# Patient Record
Sex: MALE | Race: WHITE | Employment: FULL TIME | URBAN - METROPOLITAN AREA
[De-identification: names, ages, dates, MRNs, and addresses within clinical notes are randomized per-mention and may not be internally consistent; named-entity substitution may affect disease eponyms.]

---

## 2018-03-25 ENCOUNTER — APPOINTMENT (OUTPATIENT)
Dept: GENERAL RADIOLOGY | Age: 23
End: 2018-03-25
Attending: EMERGENCY MEDICINE
Payer: COMMERCIAL

## 2018-03-25 ENCOUNTER — HOSPITAL ENCOUNTER (EMERGENCY)
Age: 23
Discharge: HOME | End: 2018-03-25
Attending: EMERGENCY MEDICINE
Payer: COMMERCIAL

## 2018-03-25 VITALS
BODY MASS INDEX: 34.27 KG/M2 | TEMPERATURE: 99 F | HEART RATE: 124 BPM | SYSTOLIC BLOOD PRESSURE: 149 MMHG | WEIGHT: 253 LBS | OXYGEN SATURATION: 98 % | HEIGHT: 72 IN | DIASTOLIC BLOOD PRESSURE: 90 MMHG | RESPIRATION RATE: 20 BRPM

## 2018-03-25 DIAGNOSIS — S60.221A CONTUSION OF RIGHT HAND, INITIAL ENCOUNTER: ICD-10-CM

## 2018-03-25 DIAGNOSIS — S63.501A SPRAIN OF RIGHT WRIST, INITIAL ENCOUNTER: Primary | ICD-10-CM

## 2018-03-25 PROCEDURE — 73130 X-RAY EXAM OF HAND: CPT | Performed by: EMERGENCY MEDICINE

## 2018-03-25 PROCEDURE — 73110 X-RAY EXAM OF WRIST: CPT | Performed by: EMERGENCY MEDICINE

## 2018-03-25 PROCEDURE — 99283 EMERGENCY DEPT VISIT LOW MDM: CPT

## 2018-03-25 RX ORDER — ERGOCALCIFEROL 1.25 MG/1
CAPSULE ORAL
COMMUNITY
End: 2019-12-26 | Stop reason: ALTCHOICE

## 2018-03-25 RX ORDER — DEXMETHYLPHENIDATE HYDROCHLORIDE 20 MG/1
40 CAPSULE, EXTENDED RELEASE ORAL DAILY
COMMUNITY
End: 2019-12-06

## 2018-03-25 NOTE — ED PROVIDER NOTES
Patient Seen in: THE [facility] Emergency Department In [place]    History   Patient presents with:  Upper Extremity Injury (musculoskeletal)    Stated Complaint:     HPI    70-year-old male comes the hospital with a complaint of having pain by the right wris Technologist)  Patient fell onto outstretched right arm on concrete today injuring his right hand and wrist. He has pain throughout the palmar aspect of his right metacarpals radiating to his wrist. He has a tingling sensation throughout his digits.  Unable [id number]  [phone]    Schedule an appointment as soon as possible for a visit in 2 days          Medications Prescribed:  Current Discharge Medication List

## 2019-12-26 ENCOUNTER — HOSPITAL ENCOUNTER (EMERGENCY)
Age: 24
Discharge: HOME | End: 2019-12-26
Attending: EMERGENCY MEDICINE
Payer: COMMERCIAL

## 2019-12-26 VITALS
WEIGHT: 265 LBS | TEMPERATURE: 99 F | HEIGHT: 72 IN | SYSTOLIC BLOOD PRESSURE: 161 MMHG | OXYGEN SATURATION: 96 % | RESPIRATION RATE: 20 BRPM | HEART RATE: 103 BPM | DIASTOLIC BLOOD PRESSURE: 74 MMHG | BODY MASS INDEX: 35.89 KG/M2

## 2019-12-26 DIAGNOSIS — M26.622 ARTHRALGIA OF LEFT TEMPOROMANDIBULAR JOINT: Primary | ICD-10-CM

## 2019-12-26 PROCEDURE — 99283 EMERGENCY DEPT VISIT LOW MDM: CPT

## 2019-12-26 RX ORDER — CYCLOBENZAPRINE HCL 10 MG
10 TABLET ORAL 3 TIMES DAILY PRN
Qty: 20 TABLET | Refills: 0 | Status: SHIPPED | OUTPATIENT
Start: 2019-12-26 | End: 2020-01-02

## 2019-12-27 NOTE — ED NOTES
I reviewed that chart and discussed the case. I have examined the patient and noted complains of jaw pain primarily when he closes his jaw. He states that he had no trauma or injury. The patient denies any teeth pain swelling fevers.   The pain is defini

## 2019-12-27 NOTE — ED PROVIDER NOTES
Patient Seen in: [facility] In [place]      History   Patient presents with:  Jaw Pain    Stated Complaint: L SIDE JAW PAIN    22-year-old male who presents to the emergency room with complaints of left jaw pain and swelling for the p (!) 161/74   Pulse 103   Temp 99 °F (37.2 °C) (Temporal)   Resp 20   Ht 182.9 cm (6')   Wt 120.2 kg   SpO2 96%   BMI 35.94 kg/m²         Physical Exam  Vitals signs and nursing note reviewed. Constitutional:       General: He is not in acute distress. signs and symptoms which should prompt immediate return. The patient and/or family expressed clear understanding of these instructions and agrees to the following plan provided.   The patient and/or family was also given written discharge instructions incl

## 2020-01-23 ENCOUNTER — HOSPITAL ENCOUNTER (EMERGENCY)
Age: 25
Discharge: HOME | End: 2020-01-23
Attending: EMERGENCY MEDICINE
Payer: COMMERCIAL

## 2020-01-23 VITALS
RESPIRATION RATE: 16 BRPM | OXYGEN SATURATION: 97 % | WEIGHT: 265 LBS | TEMPERATURE: 98 F | BODY MASS INDEX: 35.89 KG/M2 | DIASTOLIC BLOOD PRESSURE: 84 MMHG | HEART RATE: 94 BPM | HEIGHT: 72 IN | SYSTOLIC BLOOD PRESSURE: 158 MMHG

## 2020-01-23 DIAGNOSIS — Z11.3 SCREEN FOR STD (SEXUALLY TRANSMITTED DISEASE): Primary | ICD-10-CM

## 2020-01-23 PROCEDURE — 87491 CHLMYD TRACH DNA AMP PROBE: CPT | Performed by: EMERGENCY MEDICINE

## 2020-01-23 PROCEDURE — 99283 EMERGENCY DEPT VISIT LOW MDM: CPT

## 2020-01-23 PROCEDURE — 87591 N.GONORRHOEAE DNA AMP PROB: CPT | Performed by: EMERGENCY MEDICINE
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
## 2020-01-24 LAB
C TRACH DNA SPEC QL NAA+PROBE: NEGATIVE
N GONORRHOEA DNA SPEC QL NAA+PROBE: NEGATIVE

## 2021-09-27 ENCOUNTER — HOSPITAL ENCOUNTER (EMERGENCY)
Age: 26
Discharge: STILL ADMITTED OR ADMINISTRATIVE | End: 2021-09-28
Attending: EMERGENCY MEDICINE
Payer: COMMERCIAL

## 2021-09-27 ENCOUNTER — APPOINTMENT (OUTPATIENT)
Dept: GENERAL RADIOLOGY | Age: 26
End: 2021-09-27
Attending: EMERGENCY MEDICINE
Payer: COMMERCIAL

## 2021-09-27 VITALS
WEIGHT: 225 LBS | HEIGHT: 72 IN | RESPIRATION RATE: 20 BRPM | HEART RATE: 108 BPM | SYSTOLIC BLOOD PRESSURE: 121 MMHG | DIASTOLIC BLOOD PRESSURE: 59 MMHG | TEMPERATURE: 99 F | OXYGEN SATURATION: 98 % | BODY MASS INDEX: 30.48 KG/M2

## 2021-09-27 DIAGNOSIS — S82.042C: Primary | ICD-10-CM

## 2021-09-27 PROBLEM — R79.89 AZOTEMIA: Status: ACTIVE | Noted: 2021-09-27

## 2021-09-27 LAB
ALBUMIN SERPL-MCNC: 3.4 G/DL (ref 3.4–5)
ALBUMIN/GLOB SERPL: 0.7 {RATIO} (ref 1–2)
ALP LIVER SERPL-CCNC: 112 U/L
ALT SERPL-CCNC: 58 U/L
ANION GAP SERPL CALC-SCNC: 6 MMOL/L (ref 0–18)
AST SERPL-CCNC: 24 U/L (ref 15–37)
BASOPHILS # BLD AUTO: 0.05 X10(3) UL (ref 0–0.2)
BASOPHILS NFR BLD AUTO: 0.5 %
BILIRUB SERPL-MCNC: 0.3 MG/DL (ref 0.1–2)
BUN BLD-MCNC: 20 MG/DL (ref 7–18)
CALCIUM BLD-MCNC: 9.5 MG/DL (ref 8.5–10.1)
CHLORIDE SERPL-SCNC: 106 MMOL/L (ref 98–112)
CO2 SERPL-SCNC: 25 MMOL/L (ref 21–32)
CREAT BLD-MCNC: 0.61 MG/DL
CRP SERPL-MCNC: 4.43 MG/DL (ref ?–0.3)
EOSINOPHIL # BLD AUTO: 0.25 X10(3) UL (ref 0–0.7)
EOSINOPHIL NFR BLD AUTO: 2.5 %
ERYTHROCYTE [DISTWIDTH] IN BLOOD BY AUTOMATED COUNT: 14.2 %
GLOBULIN PLAS-MCNC: 4.8 G/DL (ref 2.8–4.4)
GLUCOSE BLD-MCNC: 74 MG/DL (ref 70–99)
HCT VFR BLD AUTO: 45.2 %
HGB BLD-MCNC: 14.7 G/DL
IMM GRANULOCYTES # BLD AUTO: 0.12 X10(3) UL (ref 0–1)
IMM GRANULOCYTES NFR BLD: 1.2 %
LYMPHOCYTES # BLD AUTO: 2.07 X10(3) UL (ref 1–4)
LYMPHOCYTES NFR BLD AUTO: 20.4 %
MCH RBC QN AUTO: 27.7 PG (ref 26–34)
MCHC RBC AUTO-ENTMCNC: 32.5 G/DL (ref 31–37)
MCV RBC AUTO: 85.3 FL
MONOCYTES # BLD AUTO: 0.73 X10(3) UL (ref 0.1–1)
MONOCYTES NFR BLD AUTO: 7.2 %
NEUTROPHILS # BLD AUTO: 6.92 X10 (3) UL (ref 1.5–7.7)
NEUTROPHILS # BLD AUTO: 6.92 X10(3) UL (ref 1.5–7.7)
NEUTROPHILS NFR BLD AUTO: 68.2 %
OSMOLALITY SERPL CALC.SUM OF ELEC: 285 MOSM/KG (ref 275–295)
PLATELET # BLD AUTO: 351 10(3)UL (ref 150–450)
POTASSIUM SERPL-SCNC: 4.2 MMOL/L (ref 3.5–5.1)
PROT SERPL-MCNC: 8.2 G/DL (ref 6.4–8.2)
RBC # BLD AUTO: 5.3 X10(6)UL
SARS-COV-2 RNA RESP QL NAA+PROBE: NOT DETECTED
SED RATE-ML: 48 MM/HR
SODIUM SERPL-SCNC: 137 MMOL/L (ref 136–145)
WBC # BLD AUTO: 10.1 X10(3) UL (ref 4–11)

## 2021-09-27 PROCEDURE — 99285 EMERGENCY DEPT VISIT HI MDM: CPT

## 2021-09-27 PROCEDURE — 85652 RBC SED RATE AUTOMATED: CPT | Performed by: EMERGENCY MEDICINE

## 2021-09-27 PROCEDURE — 87077 CULTURE AEROBIC IDENTIFY: CPT | Performed by: EMERGENCY MEDICINE

## 2021-09-27 PROCEDURE — 96366 THER/PROPH/DIAG IV INF ADDON: CPT

## 2021-09-27 PROCEDURE — 80053 COMPREHEN METABOLIC PANEL: CPT | Performed by: EMERGENCY MEDICINE

## 2021-09-27 PROCEDURE — 87070 CULTURE OTHR SPECIMN AEROBIC: CPT | Performed by: EMERGENCY MEDICINE

## 2021-09-27 PROCEDURE — 87186 SC STD MICRODIL/AGAR DIL: CPT | Performed by: EMERGENCY MEDICINE

## 2021-09-27 PROCEDURE — 85025 COMPLETE CBC W/AUTO DIFF WBC: CPT | Performed by: EMERGENCY MEDICINE

## 2021-09-27 PROCEDURE — 87205 SMEAR GRAM STAIN: CPT | Performed by: EMERGENCY MEDICINE

## 2021-09-27 PROCEDURE — 73560 X-RAY EXAM OF KNEE 1 OR 2: CPT | Performed by: EMERGENCY MEDICINE

## 2021-09-27 PROCEDURE — 96365 THER/PROPH/DIAG IV INF INIT: CPT

## 2021-09-27 PROCEDURE — 86140 C-REACTIVE PROTEIN: CPT | Performed by: EMERGENCY MEDICINE

## 2021-09-27 PROCEDURE — 73562 X-RAY EXAM OF KNEE 3: CPT | Performed by: EMERGENCY MEDICINE

## 2021-09-27 RX ORDER — ONDANSETRON 4 MG/1
4 TABLET, ORALLY DISINTEGRATING ORAL ONCE
Status: COMPLETED | OUTPATIENT
Start: 2021-09-27 | End: 2021-09-27

## 2021-09-27 RX ORDER — GABAPENTIN 400 MG/1
500 CAPSULE ORAL 2 TIMES DAILY
COMMUNITY

## 2021-09-27 RX ORDER — TAMSULOSIN HYDROCHLORIDE 0.4 MG/1
CAPSULE ORAL DAILY
COMMUNITY

## 2021-09-27 RX ORDER — CYCLOBENZAPRINE HCL 10 MG
10 TABLET ORAL 2 TIMES DAILY
COMMUNITY

## 2021-09-27 RX ORDER — PRAZOSIN HYDROCHLORIDE 1 MG/1
1 CAPSULE ORAL NIGHTLY
COMMUNITY
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
## 2021-09-27 NOTE — ED PROVIDER NOTES
Patient Seen in: THE [facility] Emergency Department In [place]      History   Patient presents with:  Rash Skin Problem    Stated Complaint: wound evaluation to amputation site    Subjective:   HPI    31-year-old with a history of asthma, ADHD, hypertension, reviewed and negative except as noted above.     Physical Exam     ED Triage Vitals [09/27/21 1619]   /73   Pulse 77   Resp 16   Temp 97.9 °F (36.6 °C)   Temp src Temporal   SpO2 98 %   O2 Device None (Room air)       Current:/59   Pulse 108   T CBC W/ DIFFERENTIAL[[id number]]                              Final result                 Please view results for these tests on the individual orders.    AEROBIC BACTERIAL CULTURE   CBC W/ DIFFERENTIAL     Left knee: Comminuted, displaced and an

## 2021-09-30 NOTE — ED QUICK NOTES
Dr. Tammy Gonsalves resident from [address] called. He is working with id. Wound culture results given to him.

## 2023-02-12 NOTE — ED NOTES
Dr. Taylor Hammer from [facility] ([facility]) called back regarding lab result.
PT transferred to [facility] ([facility]). Will fax culture result.
12-Feb-2023 21:00

## 2024-09-26 ENCOUNTER — HOSPITAL ENCOUNTER (EMERGENCY)
Age: 29
Discharge: HOME | End: 2024-09-26
Attending: EMERGENCY MEDICINE
Payer: MEDICARE

## 2024-09-26 ENCOUNTER — APPOINTMENT (OUTPATIENT)
Dept: GENERAL RADIOLOGY | Age: 29
End: 2024-09-26
Attending: EMERGENCY MEDICINE
Payer: MEDICARE

## 2024-09-26 VITALS
DIASTOLIC BLOOD PRESSURE: 100 MMHG | SYSTOLIC BLOOD PRESSURE: 150 MMHG | RESPIRATION RATE: 16 BRPM | OXYGEN SATURATION: 98 % | WEIGHT: 250 LBS | HEART RATE: 98 BPM | TEMPERATURE: 98 F | HEIGHT: 73 IN | BODY MASS INDEX: 33.13 KG/M2

## 2024-09-26 DIAGNOSIS — S52.022A CLOSED FRACTURE OF OLECRANON PROCESS OF LEFT ULNA, INITIAL ENCOUNTER: Primary | ICD-10-CM

## 2024-09-26 PROCEDURE — 99284 EMERGENCY DEPT VISIT MOD MDM: CPT

## 2024-09-26 PROCEDURE — 73080 X-RAY EXAM OF ELBOW: CPT | Performed by: EMERGENCY MEDICINE

## 2024-09-26 RX ORDER — TRAZODONE HYDROCHLORIDE 50 MG/1
50 TABLET, FILM COATED ORAL NIGHTLY
COMMUNITY

## 2024-09-26 RX ORDER — HYDROCODONE BITARTRATE AND ACETAMINOPHEN 5; 325 MG/1; MG/1
2 TABLET ORAL ONCE
Status: COMPLETED | OUTPATIENT
Start: 2024-09-26 | End: 2024-09-26

## 2024-09-26 RX ORDER — BUPROPION HYDROCHLORIDE 300 MG/1
300 TABLET ORAL DAILY
COMMUNITY

## 2024-09-26 RX ORDER — HYDROCODONE BITARTRATE AND ACETAMINOPHEN 5; 325 MG/1; MG/1
1 TABLET ORAL EVERY 8 HOURS PRN
Qty: 15 TABLET | Refills: 0 | Status: SHIPPED | OUTPATIENT
Start: 2024-09-26 | End: 2024-10-01

## 2024-09-26 RX ORDER — ASPIRIN 81 MG/1
TABLET, CHEWABLE ORAL DAILY
COMMUNITY

## 2024-09-26 NOTE — ED INITIAL ASSESSMENT (HPI)
Pt to ed with left arm/elbow injury after missing 1 step [place] the movie theater today. Tingling in fingers.

## 2024-09-27 NOTE — ED PROVIDER NOTES
Patient Seen in: [facility] In [place]      History     Chief Complaint   Patient presents with    Arm or Hand Injury     Stated Complaint: left elbow injury, fall down one step    Subjective:   29-year-old male, history of hypertension and renal failure, left lower leg amputation, presents with mechanical fall, left elbow injury.  Patient had plates and screws for fracture to his left distal radius in the past.  Tonight he is at the movie theater, questioning because he did not want take his wheelchair into the movie theater, and the last step he got imbalanced on his crutches and fell forward into a flexed left elbow.  No head neck or back injuries or pain.  No other pain or injuries.  Pain to the left elbow which does not radiate.  Nothing in the shoulder forearm wrist or hand.  Right-hand-dominant.  Not on blood thinners            Objective:   Past Medical History:    ADHD    Asthma (HCC)    DVT, femoral, acute (HCC)    Essential hypertension    Kidney failure              Past Surgical History:   Procedure Laterality Date    Below knee leg amputation Left 04/27/2021    Femur fracture surgery Left     titanium patrick    Pelvis/hip joint surgery unlisted      screws    Tonsillectomy      Upper arm/elbow surgery unlisted      Wrist fracture surgery Right                 Social History     Socioeconomic History    Marital status: Single   Tobacco Use    Smoking status: Never    Smokeless tobacco: Never   Vaping Use    Vaping status: Every Day   Substance and Sexual Activity    Alcohol use: Not Currently    Drug use: Never              Review of Systems   Respiratory:  Negative for shortness of breath.    Cardiovascular:  Negative for chest pain.   Gastrointestinal:  Negative for abdominal pain.   Musculoskeletal:  Positive for arthralgias. Negative for back pain and neck pain.   Neurological:  Negative for dizziness, syncope and headaches.   Hematological:  Does not bruise/bleed easily.        Positive for stated Chief Complaint: Arm or Hand Injury    Other systems are as noted in HPI.  Constitutional and vital signs reviewed.      All other systems reviewed and negative except as noted above.    Physical Exam     ED Triage Vitals [09/26/24 1902]   BP (!) 128/98   Pulse 92   Resp 18   Temp 97.9 °F (36.6 °C)   Temp src Temporal   SpO2 97 %   O2 Device None (Room air)       Current Vitals:   Vital Signs  BP: (!) 128/98  Pulse: 92  Resp: 18  Temp: 97.9 °F (36.6 °C)  Temp src: Temporal    Oxygen Therapy  SpO2: 97 %  O2 Device: None (Room air)            Physical Exam  Vitals and nursing note reviewed.   Constitutional:       Appearance: He is not toxic-appearing.   HENT:      Head: Normocephalic and atraumatic.   Cardiovascular:      Rate and Rhythm: Normal rate.      Heart sounds: Normal heart sounds.   Pulmonary:      Effort: Pulmonary effort is normal. No respiratory distress.      Breath sounds: Normal breath sounds.   Chest:      Chest wall: No tenderness.   Abdominal:      Palpations: Abdomen is soft.   Musculoskeletal:      Cervical back: Neck supple.   Skin:     General: Skin is warm and dry.   Neurological:      General: No focal deficit present.      Mental Status: He is alert.   Psychiatric:         Mood and Affect: Mood normal.         Behavior: Behavior normal.         Tenderness to the left olecranon and elbow.  No pain in the forearm wrist or hand.  No pain in the humerus or shoulder.  No clavicular pain.  Neck or back pain.  No chest wall pain.  No abdominal pain.  2+ radial pulses.  Cap refill intact.  CMS intact.    ED Course   Labs Reviewed - No data to display                   MDM      XR ELBOW, COMPLETE (MIN 3 VIEWS), LEFT (CPT=73080)    Result Date: 9/26/2024  CONCLUSION:  Moderately displaced and mildly angulated intra-articular fracture through the olecranon process of the proximal left ulna with displacement measured at approximately 1.7 cm.  LOCATION:  [id number]    Dictated by  (CST): Femi Marr MD on 9/26/2024 at 7:15 PM     Finalized by (CST): Femi Marr MD on 9/26/2024 at 7:16 PM        External chart review demonstrates outpatient visits in 2021 with duly ortho out [facility], Dr. Keon Cuellar for his forearm wrist fracture      Mother at bedside helpful to provide information on the history presenting illness      I independently interpreted the x-ray of the left elbow and note the olecranon fracture      Differential diagnosis includes, but limited to, fracture, sprain, strain, contusion, dislocation      29-year-old male with olecranon fracture of the left elbow.  Discussed with Dr. Bee, can splinted in slight extension and can follow-up with orthopedics, Dr. Cuellar.  He has seen him before for surgery on that extremity.  [place] here.  Vital signs are stable.  No other injuries.  Is comfortable going home as he does have a wheelchair that he can use, does not need to crutch.  He is right-hand dominant.  Neurovascular intact.  Discharge home, shared decision made utilized and return precaution provided.  Pain control, immobilization.  Strict return precautions for compartment syndrome etc.                                           Medical Decision Making      Disposition and Plan     Clinical Impression:  1. Closed fracture of olecranon process of left ulna, initial encounter         Disposition:  Discharge  9/26/2024  7:36 pm    Follow-up:  Keon Cuellar MD  [address] [place] [id number]  [phone]    Schedule an appointment as soon as possible for a visit            Medications Prescribed:  Current Discharge Medication List        START taking these medications    Details   HYDROcodone-acetaminophen 5-325 MG Oral Tab Take 1 tablet by mouth every 8 (eight) hours as needed for Pain.  Qty: 15 tablet, Refills: 0    Associated Diagnoses: Closed fracture of olecranon process of left ulna, initial encounter

## (undated) NOTE — ED AVS SNAPSHOT
Yossiopal Noa   MRN: [id number]    Department:  THE [facility] Emergency Department in [place]   Date of Visit:  1/23/2020           Disclosure     Insurance plans vary and the physician(s) referred by the ER may not be covered by your plan.  Please conta tell this physician (or your personal doctor if your instructions are to return to your personal doctor) about any new or lasting problems. The primary care or specialist physician will see patients referred from the [facility] Emergency Department.  Salvadore Skiff

## (undated) NOTE — ED AVS SNAPSHOT
Lauren Ram   MRN: [id number]    Department:  [facility] Department in [place]   Date of Visit:  3/25/2018           Disclosure     Insurance plans vary and the physician(s) referred by the ER may not be covered by your plan.  Please conta tell this physician (or your personal doctor if your instructions are to return to your personal doctor) about any new or lasting problems. The primary care or specialist physician will see patients referred from the [facility] Emergency Department.  Salvadore Skiff

## (undated) NOTE — ED AVS SNAPSHOT
Guilhermeignacio Angelo   MRN: [id number]    Department:  THE [facility] Emergency Department in [place]   Date of Visit:  12/26/2019           Disclosure     Insurance plans vary and the physician(s) referred by the ER may not be covered by your plan.  Please cont tell this physician (or your personal doctor if your instructions are to return to your personal doctor) about any new or lasting problems. The primary care or specialist physician will see patients referred from the [facility] Emergency Department.  Shelton Lombard